# Patient Record
Sex: MALE | Race: WHITE | NOT HISPANIC OR LATINO | Employment: UNEMPLOYED | URBAN - NONMETROPOLITAN AREA
[De-identification: names, ages, dates, MRNs, and addresses within clinical notes are randomized per-mention and may not be internally consistent; named-entity substitution may affect disease eponyms.]

---

## 2017-01-09 RX ORDER — RANOLAZINE 500 MG/1
TABLET, FILM COATED, EXTENDED RELEASE ORAL
Qty: 60 TABLET | Refills: 3 | Status: SHIPPED | OUTPATIENT
Start: 2017-01-09 | End: 2017-01-17

## 2017-01-17 ENCOUNTER — OFFICE VISIT (OUTPATIENT)
Dept: CARDIOLOGY | Facility: CLINIC | Age: 57
End: 2017-01-17

## 2017-01-17 ENCOUNTER — TELEPHONE (OUTPATIENT)
Dept: CARDIOLOGY | Facility: CLINIC | Age: 57
End: 2017-01-17

## 2017-01-17 VITALS
HEART RATE: 60 BPM | SYSTOLIC BLOOD PRESSURE: 118 MMHG | WEIGHT: 221 LBS | HEIGHT: 73 IN | BODY MASS INDEX: 29.29 KG/M2 | DIASTOLIC BLOOD PRESSURE: 72 MMHG

## 2017-01-17 DIAGNOSIS — R00.2 PALPITATIONS: ICD-10-CM

## 2017-01-17 DIAGNOSIS — I10 ESSENTIAL HYPERTENSION: ICD-10-CM

## 2017-01-17 DIAGNOSIS — R07.89 OTHER CHEST PAIN: ICD-10-CM

## 2017-01-17 DIAGNOSIS — R94.39 ABNORMAL CARDIOVASCULAR STRESS TEST: Primary | ICD-10-CM

## 2017-01-17 DIAGNOSIS — C90.01 MULTIPLE MYELOMA IN REMISSION (HCC): ICD-10-CM

## 2017-01-17 DIAGNOSIS — I34.0 NON-RHEUMATIC MITRAL REGURGITATION: ICD-10-CM

## 2017-01-17 DIAGNOSIS — I25.118 CORONARY ARTERY DISEASE INVOLVING NATIVE CORONARY ARTERY OF NATIVE HEART WITH OTHER FORM OF ANGINA PECTORIS (HCC): ICD-10-CM

## 2017-01-17 DIAGNOSIS — I51.9 LEFT VENTRICULAR DYSFUNCTION: ICD-10-CM

## 2017-01-17 PROCEDURE — 99214 OFFICE O/P EST MOD 30 MIN: CPT | Performed by: NURSE PRACTITIONER

## 2017-01-17 RX ORDER — OXYCODONE HYDROCHLORIDE AND ACETAMINOPHEN 5; 325 MG/1; MG/1
1 TABLET ORAL 2 TIMES DAILY
COMMUNITY

## 2017-01-17 RX ORDER — CITALOPRAM 20 MG/1
20 TABLET ORAL DAILY
COMMUNITY

## 2017-01-17 RX ORDER — ISOSORBIDE MONONITRATE 60 MG/1
60 TABLET, EXTENDED RELEASE ORAL EVERY MORNING
Qty: 30 TABLET | Refills: 6 | Status: SHIPPED | OUTPATIENT
Start: 2017-01-17 | End: 2017-02-16

## 2017-01-17 RX ORDER — PRASUGREL 10 MG/1
10 TABLET, FILM COATED ORAL DAILY
Qty: 30 TABLET | Refills: 6 | Status: SHIPPED | OUTPATIENT
Start: 2017-01-17 | End: 2017-02-07 | Stop reason: SDUPTHER

## 2017-01-17 RX ORDER — RANOLAZINE 500 MG/1
500 TABLET, EXTENDED RELEASE ORAL 2 TIMES DAILY
Qty: 60 TABLET | Refills: 6 | COMMUNITY
Start: 2017-01-17 | End: 2017-02-07 | Stop reason: SDUPTHER

## 2017-01-17 RX ORDER — ATORVASTATIN CALCIUM 40 MG/1
40 TABLET, FILM COATED ORAL DAILY
Qty: 30 TABLET | Refills: 6 | Status: SHIPPED | OUTPATIENT
Start: 2017-01-17 | End: 2017-02-16

## 2017-01-17 RX ORDER — METOCLOPRAMIDE 10 MG/1
10 TABLET ORAL 2 TIMES DAILY
COMMUNITY

## 2017-01-17 RX ORDER — ATORVASTATIN CALCIUM 40 MG/1
40 TABLET, FILM COATED ORAL DAILY
COMMUNITY
End: 2017-01-17 | Stop reason: SDUPTHER

## 2017-01-17 NOTE — MR AVS SNAPSHOT
Gee BING Lopez   1/17/2017 9:00 AM   Office Visit    Dept Phone:  [phone]   Encounter #:  [id number]    Provider:  TOMASA Arriola   Department:  [facility] CARDIOLOGY                Your Full Care Plan              Today's Medication Changes          These changes are accurate as of: 1/17/17 10:14 AM.  If you have any questions, ask your nurse or doctor.               Medication(s)that have changed:     prasugrel 10 MG tablet   Commonly known as:  EFFIENT   Take 1 tablet by mouth Daily for 30 days.   What changed:  See the new instructions.   Changed by:  TOMASA Arriola       ranolazine 500 MG 12 hr tablet   Commonly known as:  RANEXA   Take 1 tablet by mouth 2 (Two) Times a Day for 30 days.   What changed:  Another medication with the same name was removed. Continue taking this medication, and follow the directions you see here.   Changed by:  TOMASA Arriola         Stop taking medication(s)listed here:     ALPRAZolam 0.5 MG tablet   Commonly known as:  XANAX   Stopped by:  TOMASA Arriola           amLODIPine 5 MG tablet   Commonly known as:  NORVASC   Stopped by:  TOMASA Arriola                Where to Get Your Medications      These medications were sent to [facility] [address] [place] [address] [phone] [facility] [phone] [address] [address]     Phone:  [phone]     atorvastatin 40 MG tablet    isosorbide mononitrate 60 MG 24 hr tablet    metoprolol tartrate 25 MG tablet    prasugrel 10 MG tablet         Information about where to get these medications is not yet available     ! Ask your nurse or doctor about these medications     ranolazine 500 MG 12 hr tablet                  Your Updated Medication List          This list is accurate as of: 1/17/17 10:14 AM.  Always use your most recent med list.                aspirin 81 MG EC tablet       atorvastatin 40 MG tablet   Commonly  known as:  LIPITOR   Take 1 tablet by mouth Daily for 30 days.       CALCIUM + D PO       citalopram 20 MG tablet   Commonly known as:  CeleXA       gabapentin 300 MG capsule   Commonly known as:  NEURONTIN       isosorbide mononitrate 60 MG 24 hr tablet   Commonly known as:  IMDUR   Take 1 tablet by mouth Every Morning for 30 days.       metoclopramide 10 MG tablet   Commonly known as:  REGLAN       metoprolol tartrate 25 MG tablet   Commonly known as:  LOPRESSOR   Take one tablet daily       omeprazole 40 MG capsule   Commonly known as:  priLOSEC   Take 1 capsule by mouth daily. As needed only.       oxyCODONE-acetaminophen 5-325 MG per tablet   Commonly known as:  PERCOCET       prasugrel 10 MG tablet   Commonly known as:  EFFIENT   Take 1 tablet by mouth Daily for 30 days.       ranolazine 500 MG 12 hr tablet   Commonly known as:  RANEXA   Take 1 tablet by mouth 2 (Two) Times a Day for 30 days.               You Were Diagnosed With        Codes Comments    Palpitations    -  Primary ICD-10-CM: R00.2  ICD-9-CM: 785.1       Instructions     None    Patient Instructions History      Upcoming Appointments     Visit Type Date Time Department    FOLLOW UP 1/17/2017  9:00 AM [facility]    FOLLOW UP 7/20/2017  8:15 AM [facility]      MyChart Signup     Our records indicate that you have declined [facility]. If you would like to sign up for [facility], please email [email or web address] or call [phone] to obtain an activation code.             Other Info from Your Visit           Your Appointments     Jul 20, 2017  8:15 AM EDT   Follow Up with TOMASA Mahajan   [facility] (--)    [place] [id number] [phone]           Arrive 15 minutes prior to appointment.              Allergies     Codeine      Niaspan [Niacin Er]      Unable to tolerate severe flushing      Reason for Visit     Follow-up Denies shortness of  "breath. Still going to [facility] for Chemo treatments. Needs refills on cardiac meds for 30 days. Labs per [facility] every three months.     Chest Pain Complains of epigastric pain that he describes as a sharp pain. States will last for about a second then is gone. Usually happens 2-3 times per day.     Palpitations Usually at night. About the same as before.       Vital Signs     Blood Pressure Pulse Height Weight Body Mass Index Smoking Status    118/72 60 73\" (185.4 cm) 221 lb (100 kg) 29.16 kg/m2 Former Smoker      Problems and Diagnoses Noted     Palpitations    -  Primary        "

## 2017-01-17 NOTE — PROGRESS NOTES
Chief Complaint   Patient presents with   • Follow-up     Denies shortness of breath. Still going to [facility] for Chemo treatments. Needs refills on cardiac meds for 30 days. Labs per [facility] every three months.    • Chest Pain     Complains of epigastric pain that he describes as a sharp pain. States will last for about a second then is gone. Usually happens 2-3 times per day.    • Palpitations     Usually at night. About the same as before.        Subjective       Gee Lopez is a 56 y.o. male  with a history of ischemic heart disease diagnosed in 2007 with stent to the LAD. Patient underwent repeat cath in 2012 and showed patent stent in LAD. The circumflex and OM had disease being managed medically.  In 2014,  stress testing showed old infarct with no ischemic burden. Most recent echo in December showed LV function similar to prior at 45-50%, current regimen continued.  In August 2016, he developed more symptoms including epigastric/chest pain.  A repeat stress test showed a small area of anterior ischemia.  The dosage of Imdur was increased and Norvasc added.  Due to lowering blood pressure he was not able to tolerate Norvasc.  Ranexa was started.  Today he returns to the office for a follow-up appointment.  He admits to episodes of chest pain the most recent being a week ago for which he did go to the emergency room.  He states his EKG was normal.  In the evenings he experiences palpitations in the form of feeling his heart flutter.  Recently, Xanax was discontinued and he has had withdrawal symptoms and reoccurrence of anxiety. Celexa was added. He is unsure if his symptoms are related to these issues or cardiac in nature, but symptoms had persisted prior to discontinuation of Xanax.  He denies worsening shortness of breath but states he never really had an issue in the past as a cardiac symptom.  In general, he is maintaining his normal activities of daily living but has had some increase in  fatigue.    HPI         Cardiac History:    Past Surgical History   Procedure Laterality Date   • Cholecystectomy     • Cath lab procedure  02/18/2007     Cath-85%LAD, 3.0 x 28mm Cypher Stent   • Converted (historical) holter  04/09/2007     Holter-AvgHR-65BPM. Pt C/O of fluttering with no EKG changes.   • Echo - converted  10/17/2007     Echo-EF 50-55%, AnteroSepal WMA.   • Cardiovascular stress test  08/28/2008     Stress-5Min, 14Sec. 64%THR. EF 45%. Septal Infarct.   • Cardiothoracic procedure  02/21/2011     MUGA-() EF 55%   • Cardiothoracic procedure  04/11/2011     MUGA-() EF 67%   • Cardiovascular stress test  03/07/2012     Stress-([facility]) 5min, 82%THR, positive.   • Cath lab procedure  03/09/2012     Cath-EF 55%, Patent LAD Stent, 40-50%-LCX. 30-40%-OM   • Echo - converted  05/19/2014     Echo-EF 45-50%. Inferoseptal WMA.   • Cardiovascular stress test  05/19/2014     Stress-7min, 54secs. 84%THR. Ef 41%. Septical Infarct.   • Echo - converted  12/03/2015     EF 45-50%, trace MR, inferior WMA   • Cardiovascular stress test  08/24/2016     6 min, 76% THR, 174/80, LV function lower limit of normal, small anterior wall ischmia, Imdur increased, Amlodipine added, cath is symptoms persist       Current Outpatient Prescriptions   Medication Sig Dispense Refill   • aspirin 81 MG EC tablet Take 81 mg by mouth daily.     • atorvastatin (LIPITOR) 40 MG tablet Take 1 tablet by mouth Daily for 30 days. 30 tablet 6   • Calcium Carbonate-Vitamin D (CALCIUM + D PO) Take  by mouth Daily.     • citalopram (CeleXA) 20 MG tablet Take 20 mg by mouth Daily.     • gabapentin (NEURONTIN) 300 MG capsule Take 300 mg by mouth 2 (two) times a day.     • isosorbide mononitrate (IMDUR) 60 MG 24 hr tablet Take 1 tablet by mouth Every Morning for 30 days. 30 tablet 6   • metoclopramide (REGLAN) 10 MG tablet Take 10 mg by mouth 2 (Two) Times a Day.     • metoprolol tartrate (LOPRESSOR) 25 MG tablet Take one tablet daily 30 tablet 8   •  omeprazole (PriLOSEC) 40 MG capsule Take 1 capsule by mouth daily. As needed only. 30 capsule 1   • oxyCODONE-acetaminophen (PERCOCET) 5-325 MG per tablet Take 1 tablet by mouth 2 (Two) Times a Day.     • prasugrel (EFFIENT) 10 MG tablet Take 1 tablet by mouth Daily for 30 days. 30 tablet 6   • ranolazine (RANEXA) 500 MG 12 hr tablet Take 1 tablet by mouth 2 (Two) Times a Day for 30 days. 60 tablet 6     No current facility-administered medications for this visit.        Codeine and Niaspan [niacin er]    Past Medical History   Diagnosis Date   • Anxiety    • Epigastric pain 08/28/2008   • Fracture, femoral      Right leg   • H/O bone marrow transplant    • History multiple myeloma    • History of cholecystectomy    • Hypercholesterolemia    • Hypertension    • IHD (ischemic heart disease)    • Myeloma      Hx of multiple   • PCI (pneumatosis cystoides intestinalis)    • Thrombocytopenia        Social History     Social History   • Marital status:      Spouse name: N/A   • Number of children: N/A   • Years of education: N/A     Occupational History   • Not on file.     Social History Main Topics   • Smoking status: Former Smoker     Quit date: 7/12/2010   • Smokeless tobacco: Never Used   • Alcohol use No   • Drug use: No   • Sexual activity: Not on file     Other Topics Concern   • Not on file     Social History Narrative       Family History   Problem Relation Age of Onset   • Hypertension Mother    • Cancer Father    • Heart disease Father    • Heart disease Other    • Hypertension Other        Review of Systems   Constitutional: Positive for activity change and fatigue. Negative for appetite change and fever.   HENT: Negative for congestion, nosebleeds, sinus pressure, trouble swallowing and voice change.    Eyes: Negative for visual disturbance.   Respiratory: Positive for chest tightness. Negative for cough, shortness of breath and wheezing.    Cardiovascular: Positive for chest pain and palpitations.  "Negative for leg swelling.   Gastrointestinal: Negative for abdominal distention, abdominal pain, blood in stool, constipation and nausea.   Endocrine: Negative for polydipsia, polyphagia and polyuria.   Genitourinary: Negative for difficulty urinating, dysuria and hematuria.   Musculoskeletal: Positive for arthralgias. Negative for gait problem and myalgias.   Skin: Negative for color change.   Neurological: Positive for light-headedness. Negative for dizziness, syncope, weakness, numbness and headaches.   Hematological: Does not bruise/bleed easily.   Psychiatric/Behavioral: Positive for sleep disturbance. Negative for confusion, dysphoric mood and hallucinations. The patient is nervous/anxious.        Diabetes- No  Thyroid-normal    Objective     Visit Vitals   • /72   • Pulse 60   • Ht 73\" (185.4 cm)   • Wt 221 lb (100 kg)   • BMI 29.16 kg/m2       Physical Exam   Constitutional: He is oriented to person, place, and time. Vital signs are normal. He appears well-developed.   Eyes: Pupils are equal, round, and reactive to light.   Neck: Neck supple. No JVD present. Carotid bruit is not present.   Cardiovascular: Normal rate, regular rhythm, S1 normal and S2 normal.    Murmur heard.   Systolic murmur is present with a grade of 2/6   Pulses:       Radial pulses are 3+ on the right side, and 3+ on the left side.   Pulmonary/Chest: Effort normal and breath sounds normal. He has no wheezes. He has no rales.   Abdominal: Soft. Bowel sounds are normal. He exhibits no distension. There is no tenderness.   Musculoskeletal: He exhibits no edema.   Neurological: He is alert and oriented to person, place, and time.   Skin: Skin is warm and dry.   Psychiatric: He has a normal mood and affect. His behavior is normal. Judgment and thought content normal.   Vitals reviewed.    Procedures        Assessment/Plan      Gee was seen today for follow-up, chest pain and palpitations.    Diagnoses and all orders for this " visit:    Abnormal cardiovascular stress test    Coronary artery disease involving native coronary artery of native heart with other form of angina pectoris    Palpitations    Other chest pain    Essential hypertension    Left ventricular dysfunction    Non-rheumatic mitral regurgitation    Multiple myeloma in remission    Other orders  -     ranolazine (RANEXA) 500 MG 12 hr tablet; Take 1 tablet by mouth 2 (Two) Times a Day for 30 days.  -     metoprolol tartrate (LOPRESSOR) 25 MG tablet; Take one tablet daily  -     isosorbide mononitrate (IMDUR) 60 MG 24 hr tablet; Take 1 tablet by mouth Every Morning for 30 days.  -     prasugrel (EFFIENT) 10 MG tablet; Take 1 tablet by mouth Daily for 30 days.  -     atorvastatin (LIPITOR) 40 MG tablet; Take 1 tablet by mouth Daily for 30 days.     Gee's most recent stress test showed an area of ischemia.  He reports persistent symptoms.  His case was discussed with Dr. Jason and agree to proceed with cardiac catheterization. This will be scheduled and patient informed. No medication changes made today.              Electronically signed by TOMASA Valdez,  January 20, 2017 10:07 AM

## 2017-01-17 NOTE — LETTER
January 20, 2017     Behzad Evans MD  [address] [place] [address]    Patient: Gee Lopez   YOB: 1960   Date of Visit: 1/17/2017       Dear Dr. Nathan MD:    Gee Lopez was in my office today. Below is a copy of my note.    If you have questions, please do not hesitate to call me. I look forward to following Gee along with you.         Sincerely,        TOMASA Valdez        CC: No Recipients    Chief Complaint   Patient presents with   • Follow-up     Denies shortness of breath. Still going to [facility] for Chemo treatments. Needs refills on cardiac meds for 30 days. Labs per [facility] every three months.    • Chest Pain     Complains of epigastric pain that he describes as a sharp pain. States will last for about a second then is gone. Usually happens 2-3 times per day.    • Palpitations     Usually at night. About the same as before.        Subjective       Gee Lopez is a 56 y.o. male  with a history of ischemic heart disease diagnosed in 2007 with stent to the LAD. Patient underwent repeat stenting in 2012 to both the circumflex and the OM. In 2014,  stress testing showed old infarct with no ischemic burden. Most recent echo in December showed LV function similar to prior at 45-50%, current regimen continued.  In August 2016, he developed more symptoms including epigastric/chest pain.  A repeat stress test showed a small area of anterior ischemia.  The dosage of Imdur was increased and Norvasc added.  Due to lowering blood pressure he was not able to tolerate Norvasc.  Ranexa was started.  Today he returns to the office for a follow-up appointment.  He admits to episodes of chest pain the most recent being a week ago for which he did go to the emergency room.  He states his EKG was normal.  In the evenings he experiences palpitations in the form of feeling his heart flutter.  Recently, Xanax was discontinued and he has had withdrawal symptoms and reoccurrence of  anxiety. Celexa was added. He is unsure if his symptoms are related to these issues or cardiac in nature, but symptoms had persisted prior to discontinuation of Xanax.  He denies worsening shortness of breath but states he never really had an issue in the past as a cardiac symptom.  In general, he is maintaining his normal activities of daily living but has had some increase in fatigue.    HPI         Cardiac History:    Past Surgical History   Procedure Laterality Date   • Cholecystectomy     • Cath lab procedure  02/18/2007     Cath-85%LAD, 3.0 x 28mm Cypher Stent   • Converted (historical) holter  04/09/2007     Holter-AvgHR-65BPM. Pt C/O of fluttering with no EKG changes.   • Echo - converted  10/17/2007     Echo-EF 50-55%, AnteroSepal WMA.   • Cardiovascular stress test  08/28/2008     Stress-5Min, 14Sec. 64%THR. EF 45%. Septal Infarct.   • Cardiothoracic procedure  02/21/2011     MUGA-() EF 55%   • Cardiothoracic procedure  04/11/2011     MUGA-() EF 67%   • Cardiovascular stress test  03/07/2012     Stress-([facility]) 5min, 82%THR, positive.   • Cath lab procedure  03/09/2012     Cath-EF 55%, Patent LAD Stent, 40-50%-LCX. 30-40%-OM   • Echo - converted  05/19/2014     Echo-EF 45-50%. Inferoseptal WMA.   • Cardiovascular stress test  05/19/2014     Stress-7min, 54secs. 84%THR. Ef 41%. Septical Infarct.   • Echo - converted  12/03/2015     EF 45-50%, trace MR, inferior WMA   • Cardiovascular stress test  08/24/2016     6 min, 76% THR, 174/80, LV function lower limit of normal, small anterior wall ischmia, Imdur increased, Amlodipine added, cath is symptoms persist       Current Outpatient Prescriptions   Medication Sig Dispense Refill   • aspirin 81 MG EC tablet Take 81 mg by mouth daily.     • atorvastatin (LIPITOR) 40 MG tablet Take 1 tablet by mouth Daily for 30 days. 30 tablet 6   • Calcium Carbonate-Vitamin D (CALCIUM + D PO) Take  by mouth Daily.     • citalopram (CeleXA) 20 MG tablet Take 20 mg by mouth  Daily.     • gabapentin (NEURONTIN) 300 MG capsule Take 300 mg by mouth 2 (two) times a day.     • isosorbide mononitrate (IMDUR) 60 MG 24 hr tablet Take 1 tablet by mouth Every Morning for 30 days. 30 tablet 6   • metoclopramide (REGLAN) 10 MG tablet Take 10 mg by mouth 2 (Two) Times a Day.     • metoprolol tartrate (LOPRESSOR) 25 MG tablet Take one tablet daily 30 tablet 8   • omeprazole (PriLOSEC) 40 MG capsule Take 1 capsule by mouth daily. As needed only. 30 capsule 1   • oxyCODONE-acetaminophen (PERCOCET) 5-325 MG per tablet Take 1 tablet by mouth 2 (Two) Times a Day.     • prasugrel (EFFIENT) 10 MG tablet Take 1 tablet by mouth Daily for 30 days. 30 tablet 6   • ranolazine (RANEXA) 500 MG 12 hr tablet Take 1 tablet by mouth 2 (Two) Times a Day for 30 days. 60 tablet 6     No current facility-administered medications for this visit.        Codeine and Niaspan [niacin er]    Past Medical History   Diagnosis Date   • Anxiety    • Epigastric pain 08/28/2008   • Fracture, femoral      Right leg   • H/O bone marrow transplant    • History multiple myeloma    • History of cholecystectomy    • Hypercholesterolemia    • Hypertension    • IHD (ischemic heart disease)    • Myeloma      Hx of multiple   • PCI (pneumatosis cystoides intestinalis)    • Thrombocytopenia        Social History     Social History   • Marital status:      Spouse name: N/A   • Number of children: N/A   • Years of education: N/A     Occupational History   • Not on file.     Social History Main Topics   • Smoking status: Former Smoker     Quit date: 7/12/2010   • Smokeless tobacco: Never Used   • Alcohol use No   • Drug use: No   • Sexual activity: Not on file     Other Topics Concern   • Not on file     Social History Narrative       Family History   Problem Relation Age of Onset   • Hypertension Mother    • Cancer Father    • Heart disease Father    • Heart disease Other    • Hypertension Other        Review of Systems   Constitutional:  "Positive for activity change and fatigue. Negative for appetite change and fever.   HENT: Negative for congestion, nosebleeds, sinus pressure, trouble swallowing and voice change.    Eyes: Negative for visual disturbance.   Respiratory: Positive for chest tightness. Negative for cough, shortness of breath and wheezing.    Cardiovascular: Positive for chest pain and palpitations. Negative for leg swelling.   Gastrointestinal: Negative for abdominal distention, abdominal pain, blood in stool, constipation and nausea.   Endocrine: Negative for polydipsia, polyphagia and polyuria.   Genitourinary: Negative for difficulty urinating, dysuria and hematuria.   Musculoskeletal: Positive for arthralgias. Negative for gait problem and myalgias.   Skin: Negative for color change.   Neurological: Positive for light-headedness. Negative for dizziness, syncope, weakness, numbness and headaches.   Hematological: Does not bruise/bleed easily.   Psychiatric/Behavioral: Positive for sleep disturbance. Negative for confusion, dysphoric mood and hallucinations. The patient is nervous/anxious.        Diabetes- No  Thyroid-normal    Objective     Visit Vitals   • /72   • Pulse 60   • Ht 73\" (185.4 cm)   • Wt 221 lb (100 kg)   • BMI 29.16 kg/m2       Physical Exam   Constitutional: He is oriented to person, place, and time. Vital signs are normal. He appears well-developed.   Eyes: Pupils are equal, round, and reactive to light.   Neck: Neck supple. No JVD present. Carotid bruit is not present.   Cardiovascular: Normal rate, regular rhythm, S1 normal and S2 normal.    Murmur heard.   Systolic murmur is present with a grade of 2/6   Pulses:       Radial pulses are 3+ on the right side, and 3+ on the left side.   Pulmonary/Chest: Effort normal and breath sounds normal. He has no wheezes. He has no rales.   Abdominal: Soft. Bowel sounds are normal. He exhibits no distension. There is no tenderness.   Musculoskeletal: He exhibits no " edema.   Neurological: He is alert and oriented to person, place, and time.   Skin: Skin is warm and dry.   Psychiatric: He has a normal mood and affect. His behavior is normal. Judgment and thought content normal.   Vitals reviewed.    Procedures        Assessment/Plan      Gee was seen today for follow-up, chest pain and palpitations.    Diagnoses and all orders for this visit:    Abnormal cardiovascular stress test    Coronary artery disease involving native coronary artery of native heart with other form of angina pectoris    Palpitations    Other chest pain    Essential hypertension    Left ventricular dysfunction    Non-rheumatic mitral regurgitation    Multiple myeloma in remission    Other orders  -     ranolazine (RANEXA) 500 MG 12 hr tablet; Take 1 tablet by mouth 2 (Two) Times a Day for 30 days.  -     metoprolol tartrate (LOPRESSOR) 25 MG tablet; Take one tablet daily  -     isosorbide mononitrate (IMDUR) 60 MG 24 hr tablet; Take 1 tablet by mouth Every Morning for 30 days.  -     prasugrel (EFFIENT) 10 MG tablet; Take 1 tablet by mouth Daily for 30 days.  -     atorvastatin (LIPITOR) 40 MG tablet; Take 1 tablet by mouth Daily for 30 days.     Gee's most recent stress test showed an area of ischemia.  He reports persistent symptoms.  His case was discussed with Dr. Jason and agree to proceed with cardiac catheterization. This will be scheduled and patient informed. No medication changes made today.              Electronically signed by TOMASA Valdez,  January 20, 2017 10:07 AM

## 2017-01-17 NOTE — TELEPHONE ENCOUNTER
Mr. Lopez has had persistent symptoms since positive stress test in August. The dose of Imdur was increased and Ranexa started.   Do you want to proceed with Cath? Thanks.

## 2017-01-19 ENCOUNTER — HOSPITAL ENCOUNTER (OUTPATIENT)
Dept: CARDIOLOGY | Facility: HOSPITAL | Age: 57
Discharge: HOME | End: 2017-01-19

## 2017-01-26 ENCOUNTER — OUTSIDE FACILITY SERVICE (OUTPATIENT)
Dept: CARDIOLOGY | Facility: CLINIC | Age: 57
End: 2017-01-26

## 2017-01-26 PROCEDURE — 93458 L HRT ARTERY/VENTRICLE ANGIO: CPT | Performed by: INTERNAL MEDICINE

## 2017-01-27 ENCOUNTER — OUTSIDE FACILITY SERVICE (OUTPATIENT)
Dept: CARDIOLOGY | Facility: CLINIC | Age: 57
End: 2017-01-27

## 2017-01-27 PROCEDURE — 99217 PR OBSERVATION CARE DISCHARGE MANAGEMENT: CPT | Performed by: INTERNAL MEDICINE

## 2017-02-07 ENCOUNTER — DOCUMENTATION (OUTPATIENT)
Dept: CARDIOLOGY | Facility: CLINIC | Age: 57
End: 2017-02-07

## 2017-02-07 ENCOUNTER — TELEPHONE (OUTPATIENT)
Dept: CARDIOLOGY | Facility: CLINIC | Age: 57
End: 2017-02-07

## 2017-02-07 RX ORDER — PRASUGREL 10 MG/1
10 TABLET, FILM COATED ORAL DAILY
Qty: 28 TABLET | Refills: 0 | COMMUNITY
Start: 2017-02-07 | End: 2017-03-07

## 2017-02-07 RX ORDER — RANOLAZINE 500 MG/1
500 TABLET, EXTENDED RELEASE ORAL 2 TIMES DAILY
Qty: 28 TABLET | Refills: 0 | COMMUNITY
Start: 2017-02-07 | End: 2017-02-21

## 2017-02-09 ENCOUNTER — DOCUMENTATION (OUTPATIENT)
Dept: CARDIOLOGY | Facility: CLINIC | Age: 57
End: 2017-02-09

## 2017-02-20 ENCOUNTER — DOCUMENTATION (OUTPATIENT)
Dept: CARDIOLOGY | Facility: CLINIC | Age: 57
End: 2017-02-20

## 2017-02-23 ENCOUNTER — TELEPHONE (OUTPATIENT)
Dept: CARDIOLOGY | Facility: CLINIC | Age: 57
End: 2017-02-23

## 2017-02-23 RX ORDER — RANOLAZINE 500 MG/1
500 TABLET, EXTENDED RELEASE ORAL 2 TIMES DAILY
Qty: 60 TABLET | Refills: 3 | Status: SHIPPED | OUTPATIENT
Start: 2017-02-23 | End: 2017-03-17 | Stop reason: SDUPTHER

## 2017-02-28 ENCOUNTER — TELEPHONE (OUTPATIENT)
Dept: CARDIOLOGY | Facility: CLINIC | Age: 57
End: 2017-02-28

## 2017-03-17 ENCOUNTER — OFFICE VISIT (OUTPATIENT)
Dept: CARDIOLOGY | Facility: CLINIC | Age: 57
End: 2017-03-17

## 2017-03-17 ENCOUNTER — DOCUMENTATION (OUTPATIENT)
Dept: CARDIOLOGY | Facility: CLINIC | Age: 57
End: 2017-03-17

## 2017-03-17 VITALS
DIASTOLIC BLOOD PRESSURE: 68 MMHG | SYSTOLIC BLOOD PRESSURE: 100 MMHG | BODY MASS INDEX: 30.22 KG/M2 | HEART RATE: 68 BPM | WEIGHT: 228 LBS | HEIGHT: 73 IN

## 2017-03-17 DIAGNOSIS — I25.9 IHD (ISCHEMIC HEART DISEASE): ICD-10-CM

## 2017-03-17 DIAGNOSIS — I25.10 CORONARY ARTERY DISEASE INVOLVING NATIVE CORONARY ARTERY OF NATIVE HEART WITHOUT ANGINA PECTORIS: Primary | ICD-10-CM

## 2017-03-17 DIAGNOSIS — I10 ESSENTIAL HYPERTENSION: ICD-10-CM

## 2017-03-17 DIAGNOSIS — I34.0 NON-RHEUMATIC MITRAL REGURGITATION: ICD-10-CM

## 2017-03-17 PROCEDURE — 99213 OFFICE O/P EST LOW 20 MIN: CPT | Performed by: NURSE PRACTITIONER

## 2017-03-17 RX ORDER — ATORVASTATIN CALCIUM 40 MG/1
40 TABLET, FILM COATED ORAL DAILY
COMMUNITY
End: 2017-03-17 | Stop reason: SDUPTHER

## 2017-03-17 RX ORDER — ATORVASTATIN CALCIUM 40 MG/1
40 TABLET, FILM COATED ORAL DAILY
Qty: 30 TABLET | Refills: 8 | Status: SHIPPED | OUTPATIENT
Start: 2017-03-17

## 2017-03-17 RX ORDER — LISINOPRIL 5 MG/1
5 TABLET ORAL DAILY
COMMUNITY
End: 2017-03-17 | Stop reason: SDUPTHER

## 2017-03-17 RX ORDER — RANOLAZINE 500 MG/1
500 TABLET, EXTENDED RELEASE ORAL 2 TIMES DAILY
Qty: 60 TABLET | Refills: 8 | Status: SHIPPED | OUTPATIENT
Start: 2017-03-17

## 2017-03-17 RX ORDER — PRASUGREL 10 MG/1
10 TABLET, FILM COATED ORAL DAILY
Qty: 14 TABLET | Refills: 0 | COMMUNITY
Start: 2017-03-17

## 2017-03-17 RX ORDER — LISINOPRIL 5 MG/1
5 TABLET ORAL DAILY
Qty: 30 TABLET | Refills: 8 | Status: SHIPPED | OUTPATIENT
Start: 2017-03-17

## 2017-03-17 RX ORDER — ISOSORBIDE MONONITRATE 60 MG/1
60 TABLET, EXTENDED RELEASE ORAL DAILY
COMMUNITY
End: 2017-03-17 | Stop reason: HOSPADM

## 2017-03-17 RX ORDER — ISOSORBIDE MONONITRATE 30 MG/1
30 TABLET, EXTENDED RELEASE ORAL DAILY
Qty: 30 TABLET | Refills: 8 | Status: SHIPPED | OUTPATIENT
Start: 2017-03-17

## 2017-03-17 NOTE — PROGRESS NOTES
Chief Complaint   Patient presents with   • Hospital follow up     Patient had stent placed 01/26/17. He states no longer having chest pain. He reports doing good at this time.    • Med Refill     Needs refills on cardiac medication-30 day.       Cardiac Complaints  none      Subjective   Gee Lopez is a 56 y.o. male with a history of ischemic heart disease diagnosed in 2007 with stent to the LAD. Patient underwent repeat cath in 2012 and showed patent stent in LAD. The circumflex and OM had disease being managed medically. In 2014, stress testing showed old infarct with no ischemic burden. Most recent echo in December showed LV function similar to prior at 45-50%, current regimen continued.  In August 2016, he developed more symptoms including epigastric/chest pain. A repeat stress test showed a small area of anterior ischemia. The dosage of Imdur was increased and Norvasc added. Due to lowering blood pressure he was not able to tolerate Norvasc, so Ranexa was started.   At last visit, he reported more episodes of chest pain, after discussion with Dr. Jason, cardiac cath was discussed for definitive diagnosis.  Cath later done in January showed a 85 to 90% stenosis of diagonal and stent was placed.  He returns today stating he is doing well.  He denies any return of chest pain since he has been stented.  He is requesting cardiac refills.  Labs he reports with PCP.  He does state some issues with getting effient approved through insurance, currently has one month at home.  He reports he has not been taking for about 2 weeks, only had ASA therapy, as he thought he was not supposed to be taking.        Cardiac History  Past Surgical History   Procedure Laterality Date   • Converted (historical) holter  04/09/2007     Holter-AvgHR-65BPM. Pt C/O of fluttering with no EKG changes.   • Echo - converted  10/17/2007     Echo-EF 50-55%, AnteroSepal WMA.   • Cardiovascular stress test  08/28/2008     Stress-5Min, 14Sec.  64%THR. EF 45%. Septal Infarct.   • Cardiothoracic procedure  02/21/2011     MUGA-() EF 55%   • Cardiothoracic procedure  04/11/2011     MUGA-() EF 67%   • Cardiovascular stress test  03/07/2012     Stress-([facility]) 5min, 82%THR, positive.   • Echo - converted  05/19/2014     Echo-EF 45-50%. Inferoseptal WMA.   • Cardiovascular stress test  05/19/2014     Stress-7min, 54secs. 84%THR. Ef 41%. Septical Infarct.   • Echo - converted  12/03/2015     EF 45-50%, trace MR, inferior WMA   • Cardiovascular stress test  08/24/2016     6 min, 76% THR, 174/80, LV function lower limit of normal, small anterior wall ischmia, Imdur increased, Amlodipine added, cath is symptoms persist   • Cardiac catheterization  02/18/2007     Cath-85%LAD, 3.0 x 28mm Cypher Stent   • Cardiac catheterization  03/09/2012     Cath-EF 55%, Patent LAD Stent, 40-50%-LCX. 30-40%-OM   • Cardiac catheterization  01/26/2017     Patent Stent. 85% D1- 2.25x12 Resolute.       Current Outpatient Prescriptions   Medication Sig Dispense Refill   • aspirin 81 MG EC tablet Take 81 mg by mouth daily.     • atorvastatin (LIPITOR) 40 MG tablet Take 1 tablet by mouth Daily. 30 tablet 8   • Calcium Carbonate-Vitamin D (CALCIUM + D PO) Take  by mouth Daily.     • citalopram (CeleXA) 20 MG tablet Take 20 mg by mouth Daily.     • gabapentin (NEURONTIN) 300 MG capsule Take 300 mg by mouth 2 (two) times a day.     • lisinopril (PRINIVIL,ZESTRIL) 5 MG tablet Take 1 tablet by mouth Daily. 30 tablet 8   • metoclopramide (REGLAN) 10 MG tablet Take 10 mg by mouth 2 (Two) Times a Day.     • metoprolol tartrate (LOPRESSOR) 25 MG tablet Take one tablet daily 30 tablet 8   • omeprazole (PriLOSEC) 40 MG capsule Take 1 capsule by mouth daily. As needed only. 30 capsule 1   • oxyCODONE-acetaminophen (PERCOCET) 5-325 MG per tablet Take 1 tablet by mouth 2 (Two) Times a Day.     • ranolazine (RANEXA) 500 MG 12 hr tablet Take 1 tablet by mouth 2 (Two) Times a Day. 60 tablet 8   •  "isosorbide mononitrate (IMDUR) 30 MG 24 hr tablet Take 1 tablet by mouth Daily. 30 tablet 8   • prasugrel (EFFIENT) 10 MG tablet Take 1 tablet by mouth Daily. 14 tablet 0     No current facility-administered medications for this visit.        Codeine and Niaspan [niacin er]    Past Medical History   Diagnosis Date   • Anxiety    • Epigastric pain 08/28/2008   • Fracture, femoral      Right leg   • H/O bone marrow transplant    • History multiple myeloma    • History of cholecystectomy    • Hypercholesterolemia    • Hypertension    • IHD (ischemic heart disease)    • Myeloma      Hx of multiple   • PCI (pneumatosis cystoides intestinalis)    • Thrombocytopenia        Social History     Social History   • Marital status:      Spouse name: N/A   • Number of children: N/A   • Years of education: N/A     Occupational History   • Not on file.     Social History Main Topics   • Smoking status: Former Smoker     Quit date: 7/12/2010   • Smokeless tobacco: Never Used   • Alcohol use No   • Drug use: No   • Sexual activity: Not on file     Other Topics Concern   • Not on file     Social History Narrative       Family History   Problem Relation Age of Onset   • Hypertension Mother    • Cancer Father    • Heart disease Father    • Heart disease Other    • Hypertension Other        Review of Systems   Constitutional: Negative.    HENT: Negative.    Respiratory: Negative.    Cardiovascular: Negative.    Endocrine: Negative.    Musculoskeletal: Negative.    Neurological: Negative.    Psychiatric/Behavioral: Negative.        DiabetesNo  Thyroidnormal    Objective     Visit Vitals   • /68 (BP Location: Right arm)   • Pulse 68   • Ht 73\" (185.4 cm)   • Wt 228 lb (103 kg)   • BMI 30.08 kg/m2       Physical Exam   Constitutional: He is oriented to person, place, and time. He appears well-developed and well-nourished.   HENT:   Head: Normocephalic and atraumatic.   Eyes: EOM are normal. Pupils are equal, round, and reactive " to light.   Neck: Normal range of motion. Neck supple.   Cardiovascular: Normal rate and regular rhythm.    Murmur heard.  Pulmonary/Chest: Effort normal and breath sounds normal.   Abdominal: Soft.   Musculoskeletal: Normal range of motion.   Neurological: He is alert and oriented to person, place, and time.   Skin: Skin is warm and dry.   Psychiatric: He has a normal mood and affect.       Procedures    Assessment/Plan     HR and BP are both stable today.  Blood pressure remains on low side of normal so we will cut the imdur to 30mg instead of 60.  He has done very well since stenting to the diagonal and states chest pain subsided.  Labs he reports with you, could we get next copy?  Cardiac refills will be sent. He has tolerated effient well, we will do an appeal with insurance to get effient approved, patient advised to continue aspirin therapy. He has been intolerant to plavix in the past as his P2Y12 assay was abnormal on therapy.  2 weeks effient samples given. Good cardiac diet and activity as tolerated advised.  6 month follow up advised or sooner if needed.      Problems Addressed this Visit        Cardiovascular and Mediastinum    IHD (ischemic heart disease)    Relevant Medications    isosorbide mononitrate (IMDUR) 30 MG 24 hr tablet    metoprolol tartrate (LOPRESSOR) 25 MG tablet    ranolazine (RANEXA) 500 MG 12 hr tablet    prasugrel (EFFIENT) 10 MG tablet    Essential hypertension    Relevant Medications    lisinopril (PRINIVIL,ZESTRIL) 5 MG tablet    metoprolol tartrate (LOPRESSOR) 25 MG tablet    Mitral regurgitation    Relevant Medications    isosorbide mononitrate (IMDUR) 30 MG 24 hr tablet    metoprolol tartrate (LOPRESSOR) 25 MG tablet    ranolazine (RANEXA) 500 MG 12 hr tablet    prasugrel (EFFIENT) 10 MG tablet    Coronary artery disease involving native coronary artery of native heart without angina pectoris - Primary    Relevant Medications    isosorbide mononitrate (IMDUR) 30 MG 24 hr tablet     metoprolol tartrate (LOPRESSOR) 25 MG tablet    ranolazine (RANEXA) 500 MG 12 hr tablet    prasugrel (EFFIENT) 10 MG tablet              Electronically signed by TOMASA Currie March 17, 2017 11:02 AM

## 2017-03-17 NOTE — PROGRESS NOTES
Appeal letter and notes faxed to [facility] Appeal Department on 03/17/2017 for Effient 10 mg.    Update (3/28/17):  Spoke to Clifford with [facility]. Appeal is in review, should receive notice at the beginning of April. Call ref# [id number]    Received word from [employer], [place] for Effient approved from 02/20/2017 until further notice.

## 2017-07-17 ENCOUNTER — TELEPHONE (OUTPATIENT)
Dept: CARDIOLOGY | Facility: CLINIC | Age: 57
End: 2017-07-17

## 2017-07-17 NOTE — TELEPHONE ENCOUNTER
Received call from patient's wife Renetta who states patient was diagnosed with STAGE 3 lung cancer, states Dr. Figueroa at [facility] ordered a MRI Saturday and then received a phone call Sunday stating that patient had a bleed on the brain and was instructed to go straight to ER at [facility], reports that went to ER @ [facility] and had labs and CT of brain and was told that everything came back okay.    Spouse  states that was told at  / Dr. Figueroa's  to check with you to see if could stop patient's Effient? What are your recommendations?    Copy of MRI and CT report is scanned into patient's chart.         Patient is at home now but is to see oncology monday to discuss option's for chemo and radiation.

## 2017-07-18 NOTE — TELEPHONE ENCOUNTER
Called and spoke with patient's spouse and informed her that If no bleed then no need to stop Effient, verbalizes understanding of instruction's.

## 2017-11-19 NOTE — PROGRESS NOTES
Letter explaining failure of calcium channel blocker, written to [facility] regarding PA for Ranexa. Faxed on 02/09/17 @ 5:15 pm    Ranexa 500 mg approved from 02/07/2017 until further notice.    Name band;
